# Patient Record
Sex: MALE | Race: WHITE | NOT HISPANIC OR LATINO | Employment: FULL TIME | URBAN - NONMETROPOLITAN AREA
[De-identification: names, ages, dates, MRNs, and addresses within clinical notes are randomized per-mention and may not be internally consistent; named-entity substitution may affect disease eponyms.]

---

## 2017-04-20 ENCOUNTER — TRANSCRIBE ORDERS (OUTPATIENT)
Dept: GENERAL RADIOLOGY | Facility: CLINIC | Age: 47
End: 2017-04-20

## 2017-04-20 DIAGNOSIS — M25.552 LEFT HIP PAIN: Primary | ICD-10-CM

## 2017-10-20 ENCOUNTER — CLINICAL SUPPORT (OUTPATIENT)
Dept: AUDIOLOGY | Facility: CLINIC | Age: 47
End: 2017-10-20

## 2017-10-20 ENCOUNTER — OFFICE VISIT (OUTPATIENT)
Dept: OTOLARYNGOLOGY | Facility: CLINIC | Age: 47
End: 2017-10-20

## 2017-10-20 VITALS — HEIGHT: 65 IN | WEIGHT: 186 LBS | OXYGEN SATURATION: 98 % | BODY MASS INDEX: 30.99 KG/M2

## 2017-10-20 DIAGNOSIS — H61.22 IMPACTED CERUMEN OF LEFT EAR: ICD-10-CM

## 2017-10-20 DIAGNOSIS — H90.42 SENSORINEURAL HEARING LOSS (SNHL) OF LEFT EAR WITH UNRESTRICTED HEARING OF RIGHT EAR: Primary | ICD-10-CM

## 2017-10-20 DIAGNOSIS — IMO0001 LEFT ASYMMETRICAL SNHL: ICD-10-CM

## 2017-10-20 DIAGNOSIS — H81.02 MENIERE'S DISEASE OF LEFT EAR: Primary | ICD-10-CM

## 2017-10-20 PROCEDURE — 92557 COMPREHENSIVE HEARING TEST: CPT | Performed by: AUDIOLOGIST

## 2017-10-20 PROCEDURE — 92567 TYMPANOMETRY: CPT | Performed by: AUDIOLOGIST

## 2017-10-20 PROCEDURE — 99213 OFFICE O/P EST LOW 20 MIN: CPT | Performed by: OTOLARYNGOLOGY

## 2017-10-20 PROCEDURE — 69210 REMOVE IMPACTED EAR WAX UNI: CPT | Performed by: OTOLARYNGOLOGY

## 2017-10-20 RX ORDER — TRIAMTERENE AND HYDROCHLOROTHIAZIDE 37.5; 25 MG/1; MG/1
1 CAPSULE ORAL EVERY MORNING
Qty: 90 CAPSULE | Refills: 3 | Status: SHIPPED | OUTPATIENT
Start: 2017-10-20 | End: 2018-11-30 | Stop reason: SDUPTHER

## 2017-10-20 NOTE — PROGRESS NOTES
STANDARD AUDIOMETRIC EVALUATION      Name:  Aneudy Zhang  :  1970  Age:  47 y.o.  Date of Evaluation:  10/20/2017      HISTORY    Reason for visit:  Aneudy Zhang is seen today for a hearing evaluation at the request of Dr. Shayan Saucedo.  Patient reports that he is in for a 6 month follow-up.  He reports of history of Meniere's disease in his left ear.  He reports that he had a dizzy spell in August, which he thinks happened because he missed taking his water pill three days in a row while he was out of town.      EVALUATION    See Audiogram    RESULTS        Otoscopy and Tympanometry 226 Hz :  Right Ear:  Otoscopy:  Clear ear canal          Tympanometry:  Middle ear function within normal limits    Left Ear:   Otoscopy:  Testing completed after ears were cleaned        Tympanometry:  Middle ear function within normal limits    Test technique:  Standard Audiometry     Pure Tone Audiometry:   Patient responded to pure tones at 5-20 dB for 250-8000 Hz in right ear, and at 45-65 dB for 250-8000 Hz in left ear.       Speech Audiometry:        Right Ear:  Speech Reception Threshold (SRT) was obtained at 5 dBHL                 Speech Discrimination scores were 100% in quiet when words were presented at 45 dBHL       Left Ear:  Speech Reception Threshold (SRT) was obtained at 40 dBHL                 Speech Discrimination scores were 80% in masking noise when words were presented at  75 dBHL    Reliability:   good    IMPRESSIONS:  1.  Tympanometry results are consistent with Middle ear function within normal limits in both ears.  2.  Pure tone results are consistent with hearing sensitivity within normal limits for right ear, and moderate flat sensorineural hearing loss  in left ear.       RECOMMENDATIONS:  Patient is seeing the Ear Nose and Throat physician immediately following this examination.  It was a pleasure seeing Aneudy Zhang in Audiology today.  We would be happy to do further testing or  discuss these test as necessary.          This document has been electronically signed by KO Scnheider on October 20, 2017 11:22 AM          KO Schneider  Licensed Audiologist

## 2017-10-23 NOTE — PROGRESS NOTES
Subjective   Aneudy Zhang is a 47 y.o. male.       History of Present Illness   Patient has left-sided sensorineural hearing loss and has been diagnosed with Ménière's disease.  Is treated with triamterene/HCTZ.  Reports that in the year since he has seen me he did well except he forgot to take his diuretic with him on vacation back in August and had some dizziness at that time.  This improved once he got home and started taking his medication again.  He does need a new prescription.  Says he feels like his hearing is about the same.  No otorrhea.      The following portions of the patient's history were reviewed and updated as appropriate: allergies, current medications, past family history, past medical history, past social history, past surgical history and problem list.     reports that he has never smoked. He has never used smokeless tobacco.   Patient is not a tobacco user and has not been counseled for use of tobacco products      Review of Systems   Constitutional: Negative for fever.   HENT: Positive for hearing loss.            Objective   Physical Exam  General: Well-developed well-nourished male in no acute distress.  Alert and oriented ×3. Head: Normocephalic. Face: Symmetrical strength and appearance. PERRL. EOMI. Voice:Strong. Speech:Fluent  Ears: External ears no deformity, right ear canal shows no discharge.  Tympanic membrane intact and clear.  Left ear canal shows a cerumen impaction  Using the binocular microscope for visualization, cerumen impaction was removed from left ear canal(s) using instrumentation. This was personally performed by Shayan Saucedo MD   Following cerumen removal tympanic membrane was noted be intact and clear and mobile.  Nose: Nares show no discharge mass polyp or purulence.  Boggy mucosa is present.  No gross external deformity.  Septum: Midline  Oral cavity: Lips and gums without lesions.  Tongue and floor of mouth without lesions.  Parotid and submandibular  ducts unobstructed.  No mucosal lesions on the buccal mucosa or vestibule of the mouth.  Pharynx: No erythema exudate mass or ulcer  Neck: No lymphadenopathy.  No thyromegaly.  Trachea and larynx midline.  No masses in the parotid or submandibular glands.    Audiogram is obtained and reviewed and shows normal hearing in the right ear with 100% discrimination and type A tympanogram.  There is a flat moderate sensorineural hearing loss on the left with 80% discrimination.  Hearing is not substantially changed from 1 year ago.    Assessment/Plan   Aneudy was seen today for follow-up.    Diagnoses and all orders for this visit:    Meniere's disease of left ear    Left asymmetrical SNHL    Impacted cerumen of left ear      Plan: Cerumen removed as described above.  Refills called in for his triamterene/HCTZ 37.5/25 one by mouth daily.  Maintain low salt diet.  If symptoms remain well controlled return in a year with another audiogram and call sooner for problems.

## 2018-11-30 RX ORDER — TRIAMTERENE AND HYDROCHLOROTHIAZIDE 37.5; 25 MG/1; MG/1
CAPSULE ORAL
Qty: 30 CAPSULE | Refills: 1 | Status: SHIPPED | OUTPATIENT
Start: 2018-11-30 | End: 2019-01-29 | Stop reason: SDUPTHER

## 2019-01-29 ENCOUNTER — OFFICE VISIT (OUTPATIENT)
Dept: OTOLARYNGOLOGY | Facility: CLINIC | Age: 49
End: 2019-01-29

## 2019-01-29 ENCOUNTER — CLINICAL SUPPORT (OUTPATIENT)
Dept: AUDIOLOGY | Facility: CLINIC | Age: 49
End: 2019-01-29

## 2019-01-29 VITALS — RESPIRATION RATE: 17 BRPM | BODY MASS INDEX: 30.57 KG/M2 | WEIGHT: 190.2 LBS | HEIGHT: 66 IN

## 2019-01-29 DIAGNOSIS — H81.09 MENIERE DISEASE, UNSPECIFIED LATERALITY: Primary | ICD-10-CM

## 2019-01-29 DIAGNOSIS — IMO0001 LEFT ASYMMETRICAL SNHL: ICD-10-CM

## 2019-01-29 DIAGNOSIS — H81.02 MENIERE'S DISEASE OF LEFT EAR: Primary | ICD-10-CM

## 2019-01-29 DIAGNOSIS — H90.42 SENSORINEURAL HEARING LOSS (SNHL) OF LEFT EAR WITH UNRESTRICTED HEARING OF RIGHT EAR: Primary | ICD-10-CM

## 2019-01-29 PROCEDURE — 92567 TYMPANOMETRY: CPT | Performed by: AUDIOLOGIST

## 2019-01-29 PROCEDURE — 92557 COMPREHENSIVE HEARING TEST: CPT | Performed by: AUDIOLOGIST

## 2019-01-29 PROCEDURE — 99214 OFFICE O/P EST MOD 30 MIN: CPT | Performed by: OTOLARYNGOLOGY

## 2019-01-29 RX ORDER — TRIAMTERENE AND HYDROCHLOROTHIAZIDE 37.5; 25 MG/1; MG/1
1 CAPSULE ORAL EVERY MORNING
Qty: 30 CAPSULE | Refills: 11 | Status: SHIPPED | OUTPATIENT
Start: 2019-01-29 | End: 2021-02-09

## 2019-01-29 NOTE — PROGRESS NOTES
STANDARD AUDIOMETRIC EVALUATION      Name:  Aneudy Zhang  :  1970  Age:  48 y.o.  Date of Evaluation:  2019      HISTORY    Reason for visit:  Aneudy Zhang is seen today for a yearly hearing evaluation at the request of Dr. Shayan Saucedo.  Patient reports he has Meniere's Disease in his left ear.  He states he has associated symptoms such as ringing, hearing loss, and dizziness.  He reports he doesn't think his hearing has progressed.       EVALUATION    See Audiogram    RESULTS        Otoscopy and Tympanometry 226 Hz :  Right Ear:  Otoscopy:  Clear ear canal          Tympanometry:  Middle ear function within normal limits    Left Ear:   Otoscopy:  Clear ear canal        Tympanometry:  Middle ear function within normal limits    Test technique:  Standard Audiometry     Pure Tone Audiometry:   Patient responded to pure tones at 5-20 dB for 250-8000 Hz in right ear, and at 45-55 dB for 250-8000 Hz in left ear.       Speech Audiometry:        Right Ear:  Speech Reception Threshold (SRT) was obtained at 10 dBHL                 Speech Discrimination scores were 100% in quiet when words were presented at 50 dBHL       Left Ear:  Speech Reception Threshold (SRT) was obtained at 50 dBHL, masked                 Speech Discrimination scores were 72% in masking noise when words were presented at  80 dBHL    Reliability:   good    IMPRESSIONS:  1.  Tympanometry results are consistent with Middle ear function within normal limits in both ears.  2.  Pure tone results are consistent with hearing sensitivity within normal limits for right ear, and moderate flat sensorineural hearing loss  in left ear.       RECOMMENDATIONS:  Patient is seeing the Ear Nose and Throat physician immediately following this examination.  It was a pleasure seeing Aneudy Zhang in Audiology today.  We would be happy to do further testing or discuss these test as necessary.          This document has been electronically signed by  Soila Greenwood, MS OSMAN-A on January 29, 2019 3:45 PM       Soila Greenwood MS CCC-A  Licensed Audiologist

## 2019-01-31 ENCOUNTER — LAB (OUTPATIENT)
Dept: LAB | Facility: OTHER | Age: 49
End: 2019-01-31

## 2019-01-31 DIAGNOSIS — H81.09 MENIERE DISEASE, UNSPECIFIED LATERALITY: ICD-10-CM

## 2019-01-31 LAB
ANION GAP SERPL CALCULATED.3IONS-SCNC: 8 MMOL/L (ref 5–15)
BUN BLD-MCNC: 26 MG/DL (ref 9–20)
BUN/CREAT SERPL: 21.7 (ref 7–25)
CALCIUM SPEC-SCNC: 8.5 MG/DL (ref 8.4–10.2)
CHLORIDE SERPL-SCNC: 104 MMOL/L (ref 98–107)
CO2 SERPL-SCNC: 30 MMOL/L (ref 22–30)
CREAT BLD-MCNC: 1.2 MG/DL (ref 0.66–1.25)
GFR SERPL CREATININE-BSD FRML MDRD: 65 ML/MIN/1.73 (ref 63–147)
GLUCOSE BLD-MCNC: 111 MG/DL (ref 74–99)
POTASSIUM BLD-SCNC: 3.7 MMOL/L (ref 3.4–5)
SODIUM BLD-SCNC: 142 MMOL/L (ref 137–145)

## 2019-01-31 PROCEDURE — 36415 COLL VENOUS BLD VENIPUNCTURE: CPT | Performed by: OTOLARYNGOLOGY

## 2019-01-31 PROCEDURE — 80048 BASIC METABOLIC PNL TOTAL CA: CPT | Performed by: OTOLARYNGOLOGY

## 2019-02-01 NOTE — PROGRESS NOTES
Subjective   Aneudy Zhang is a 48 y.o. male.       History of Present Illness   Patient has left-sided Ménière's disease.  Has markedly asymmetrical hearing.  Had a negative MRI.  Initially got some response with medical therapy but eventually decreased again and has been subjectively not significantly changed since he was last seen in October 2017.  Takes triamterene/HCTZ 37.5/25 once a day.  Has had occasional mild dizziness but no significant episodes of vertigo.  Doesn't feel like his hearing is appreciably decreased.      The following portions of the patient's history were reviewed and updated as appropriate: allergies, current medications, past family history, past medical history, past social history, past surgical history and problem list.     reports that  has never smoked. he has never used smokeless tobacco.   Patient is not a tobacco user and has not been counseled for use of tobacco products      Review of Systems   Constitutional: Negative for fever.   HENT: Positive for hearing loss.            Objective   Physical Exam  General: Well-developed well-nourished male in no acute distress.  Alert and oriented ×3. Head: Normocephalic.Voice:Strong. Speech:Fluent  Ears: External ears no deformity, canals no discharge, tympanic membranes intact clear and mobile bilaterally.  Nose: Nares show no discharge mass polyp or purulence.  Boggy mucosa is present.  No gross external deformity.    Oral cavity: Lips and gums without lesions.  Tongue and floor of mouth without lesions.  Parotid and submandibular ducts unobstructed.  No mucosal lesions on the buccal mucosa or vestibule of the mouth.  Pharynx: No erythema exudate mass or ulcer  Neck: No lymphadenopathy.  No thyromegaly.  Trachea and larynx midline.  No masses in the parotid or submandibular glands.    Audiogram is obtained and reviewed and shows hearing within normal limits on the right and a flat moderate sensorineural hearing loss on the left.   Tympanograms are type A bilaterally.  Discrimination scores are 100% on the right 72% on the left.  Compared to October 2017 thresholds are essentially unchanged.      Assessment/Plan   Aneudy was seen today for follow-up.    Diagnoses and all orders for this visit:    Meniere's disease of left ear  -     Basic Metabolic Panel; Future    Left asymmetrical SNHL    Other orders  -     triamterene-hydrochlorothiazide (DYAZIDE) 37.5-25 MG per capsule; Take 1 capsule by mouth Every Morning.      Plan: Continue triamterene/HCTZ 37.5/25 daily.  Continue low-salt diet.  Obtained Chem-7 today.  Return in 1 year with another hearing test, call sooner for problems.    Addendum: 2/1/19, Chem-7 shows a mildly elevated BUN at 26.  Creatinine and potassium were both normal.  Spoke with patient by telephone and advised him to take his triamterene/HCTZ 1 every other day for the next month.  If his symptoms remain well controlled he should return in a month for another basic metabolic panel.  If he develops significant dizziness or notices his hearing significantly decreasing he is to call.  Patient voices understanding and agreement.

## 2020-01-31 ENCOUNTER — OFFICE VISIT (OUTPATIENT)
Dept: OTOLARYNGOLOGY | Facility: CLINIC | Age: 50
End: 2020-01-31

## 2020-01-31 ENCOUNTER — LAB (OUTPATIENT)
Dept: LAB | Facility: OTHER | Age: 50
End: 2020-01-31

## 2020-01-31 ENCOUNTER — CLINICAL SUPPORT (OUTPATIENT)
Dept: AUDIOLOGY | Facility: CLINIC | Age: 50
End: 2020-01-31

## 2020-01-31 VITALS — BODY MASS INDEX: 28.93 KG/M2 | WEIGHT: 180 LBS | OXYGEN SATURATION: 96 % | HEART RATE: 53 BPM | HEIGHT: 66 IN

## 2020-01-31 DIAGNOSIS — H81.02 MENIERE'S DISEASE OF LEFT EAR: Primary | ICD-10-CM

## 2020-01-31 DIAGNOSIS — H90.42 SENSORINEURAL HEARING LOSS (SNHL) OF LEFT EAR WITH UNRESTRICTED HEARING OF RIGHT EAR: Primary | ICD-10-CM

## 2020-01-31 DIAGNOSIS — IMO0001 LEFT ASYMMETRICAL SNHL: ICD-10-CM

## 2020-01-31 DIAGNOSIS — H81.02 MENIERE DISEASE, LEFT: ICD-10-CM

## 2020-01-31 DIAGNOSIS — H81.02 MENIERE DISEASE, LEFT: Primary | ICD-10-CM

## 2020-01-31 DIAGNOSIS — H93.12 TINNITUS, LEFT: ICD-10-CM

## 2020-01-31 LAB
ANION GAP SERPL CALCULATED.3IONS-SCNC: 6 MMOL/L (ref 5–15)
BUN BLD-MCNC: 31 MG/DL (ref 7–23)
BUN/CREAT SERPL: 27.4 (ref 7–25)
CALCIUM SPEC-SCNC: 8.7 MG/DL (ref 8.4–10.2)
CHLORIDE SERPL-SCNC: 104 MMOL/L (ref 101–112)
CO2 SERPL-SCNC: 32 MMOL/L (ref 22–30)
CREAT BLD-MCNC: 1.13 MG/DL (ref 0.7–1.3)
GFR SERPL CREATININE-BSD FRML MDRD: 69 ML/MIN/1.73 (ref 63–147)
GLUCOSE BLD-MCNC: 96 MG/DL (ref 70–99)
POTASSIUM BLD-SCNC: 3.3 MMOL/L (ref 3.4–5)
SODIUM BLD-SCNC: 142 MMOL/L (ref 137–145)

## 2020-01-31 PROCEDURE — 80048 BASIC METABOLIC PNL TOTAL CA: CPT | Performed by: OTOLARYNGOLOGY

## 2020-01-31 PROCEDURE — 92567 TYMPANOMETRY: CPT | Performed by: AUDIOLOGIST

## 2020-01-31 PROCEDURE — 99213 OFFICE O/P EST LOW 20 MIN: CPT | Performed by: OTOLARYNGOLOGY

## 2020-01-31 PROCEDURE — 36415 COLL VENOUS BLD VENIPUNCTURE: CPT | Performed by: OTOLARYNGOLOGY

## 2020-01-31 PROCEDURE — 92557 COMPREHENSIVE HEARING TEST: CPT | Performed by: AUDIOLOGIST

## 2020-01-31 RX ORDER — HYDROCHLOROTHIAZIDE 12.5 MG/1
12.5 CAPSULE, GELATIN COATED ORAL
COMMUNITY
End: 2021-02-09 | Stop reason: ALTCHOICE

## 2020-01-31 RX ORDER — FLUTICASONE PROPIONATE 50 MCG
1 SPRAY, SUSPENSION (ML) NASAL DAILY
COMMUNITY
Start: 2019-12-12 | End: 2020-12-12

## 2020-01-31 RX ORDER — ALBUTEROL SULFATE 90 UG/1
2 AEROSOL, METERED RESPIRATORY (INHALATION)
COMMUNITY
Start: 2019-12-13 | End: 2020-12-13

## 2020-01-31 RX ORDER — ALLOPURINOL 100 MG/1
100 TABLET ORAL DAILY
COMMUNITY
Start: 2019-11-01 | End: 2021-02-09 | Stop reason: SDUPTHER

## 2020-01-31 RX ORDER — CETIRIZINE HYDROCHLORIDE 10 MG/1
10 TABLET ORAL
COMMUNITY
Start: 2019-12-04 | End: 2021-08-18

## 2020-01-31 RX ORDER — ALBUTEROL SULFATE 90 UG/1
2 AEROSOL, METERED RESPIRATORY (INHALATION) EVERY 6 HOURS PRN
COMMUNITY
Start: 2019-12-13 | End: 2022-08-05

## 2020-01-31 NOTE — PROGRESS NOTES
STANDARD AUDIOMETRIC EVALUATION      Name:  Aneudy Zhang  :  1970  Age:  49 y.o.  Date of Evaluation:  2020      HISTORY    Reason for visit:  Aneudy Zhang is seen today for a yearly hearing test at the request of Dr. Shayan Saucedo.  It was previously reported the patient has Meniere's Disease in his left ear, and a unilateral sensorineural hearing loss in his left ear.  Today, Patient reports he has not noticed any changes in his hearing or new problems with his ears.  He states he has not gotten dizzy in awhile.  He states he still hears a buzzing sound in his left ear which hasn't changed.       EVALUATION    See Audiogram    RESULTS        Otoscopy and Tympanometry 226 Hz :  Right Ear:  Otoscopy:  Clear ear canal          Tympanometry:  Middle ear function within normal limits    Left Ear:   Otoscopy:  Clear ear canal        Tympanometry:  Middle ear function within normal limits    Test technique:  Standard Audiometry     Pure Tone Audiometry:   Patient responded to pure tones at 5-20 dB for 250-8000 Hz in right ear, and at 45-55 dB for 250-8000 Hz in left ear.       Speech Audiometry:        Right Ear:  Speech Reception Threshold (SRT) was obtained at 0 dBHL                 Speech Discrimination scores were 100% in quiet when words were presented at 40 dBHL       Left Ear:  Speech Reception Threshold (SRT) was obtained at 55 dBHL, masked                 Speech Discrimination scores were 68% in masking noise when words were presented at  85 dBHL    Reliability:   good    IMPRESSIONS:  1.  Tympanometry results are consistent with Middle ear function within normal limits in both ears.  2.  Pure tone results are consistent with hearing sensitivity within normal limits for right ear, and moderate flat sensorineural hearing loss  in left ear.       RECOMMENDATIONS:  Patient is seeing the Ear Nose and Throat physician immediately following this examination.  It was a pleasure seeing Aneudy Sarkar  Vicente in Audiology today.  We would be happy to do further testing or discuss these test as necessary.          This document has been electronically signed by Soila Greenwood MS CCC-A on January 31, 2020 9:56 AM       Soila Greenwood MS CCC-A  Licensed Audiologist

## 2020-01-31 NOTE — PROGRESS NOTES
Subjective   Aneudy Zhang is a 49 y.o. male.       History of Present Illness   Patient is followed with Ménière's disease of the left ear.  Has markedly asymmetrical hearing.  Had a negative MRI.  Responded to medical therapy initially but subsequently had decreased on the left and has stayed relatively stable with a flat moderate hearing loss on the left and normal hearing on the right.  1 year ago was seen and was taking triamterene/HCTZ 37.5/25 daily.  His BUN was slightly elevated and I advised him to take his diuretic every other day for the next month and return for more lab work but he did not do this.  Review of his chart shows he was seen elsewhere and underwent a renal function panel in July 2019 with a mildly elevated BUN of 24 and a mildly elevated creatinine of 1.4.  He says his medical regimen was not changed and he continues to take 1 pill a day.  No dizziness and no subjective change in hearing.  His current medication list actually show him to be on 3 different diuretics      The following portions of the patient's history were reviewed and updated as appropriate: allergies, current medications, past family history, past medical history, past social history, past surgical history and problem list.     reports that he has never smoked. He has never used smokeless tobacco.   Patient is not a tobacco user and has not been counseled for use of tobacco products      Review of Systems   Constitutional: Positive for appetite change. Negative for fever.   HENT: Positive for hearing loss.    Respiratory: Positive for cough and wheezing.            Objective   Physical Exam  Ears: External ears no deformity, canals no discharge, tympanic membranes intact clear and mobile bilaterally.    Audiogram is obtained and reviewed and shows normal hearing in the right ear with a flat moderate sensorineural hearing loss on the left that is not significantly changed from 1 year ago.  Tympanograms are type a  bilaterally.  Discrimination scores are 100% on the right 68% on the left.      Assessment/Plan   Aneudy was seen today for follow-up.    Diagnoses and all orders for this visit:    Meniere's disease of left ear    Left asymmetrical SNHL        Plan: We will recheck basic metabolic panel today.  If still abnormal would recommend permanently decreasing his diuretic dosage to either half pill a day or 1 pill every other day.  Will call and discuss results once available.  We will go ahead and schedule his 1 year follow-up with audiogram but may see him sooner depending on laboratory studies

## 2020-02-05 DIAGNOSIS — H81.09 MENIERE'S DISEASE, UNSPECIFIED LATERALITY: Primary | ICD-10-CM

## 2020-06-17 RX ORDER — TRIAMTERENE AND HYDROCHLOROTHIAZIDE 37.5; 25 MG/1; MG/1
CAPSULE ORAL
Qty: 30 CAPSULE | Refills: 0 | OUTPATIENT
Start: 2020-06-17

## 2020-06-17 NOTE — TELEPHONE ENCOUNTER
Patient was supposed to have obtained a basic metabolic panel earlier this year to determine whether or not his dose needed to be adjusted.  Will not refill until he gets the lab work done

## 2020-06-18 ENCOUNTER — TELEPHONE (OUTPATIENT)
Dept: OTOLARYNGOLOGY | Facility: CLINIC | Age: 50
End: 2020-06-18

## 2020-06-18 ENCOUNTER — LAB (OUTPATIENT)
Dept: LAB | Facility: OTHER | Age: 50
End: 2020-06-18

## 2020-06-18 DIAGNOSIS — H81.09 MENIERE'S DISEASE, UNSPECIFIED LATERALITY: ICD-10-CM

## 2020-06-18 LAB
ANION GAP SERPL CALCULATED.3IONS-SCNC: 6 MMOL/L (ref 5–15)
BUN BLD-MCNC: 34 MG/DL (ref 7–23)
BUN/CREAT SERPL: 25.6 (ref 7–25)
CALCIUM SPEC-SCNC: 9.1 MG/DL (ref 8.4–10.2)
CHLORIDE SERPL-SCNC: 102 MMOL/L (ref 101–112)
CO2 SERPL-SCNC: 29 MMOL/L (ref 22–30)
CREAT BLD-MCNC: 1.33 MG/DL (ref 0.7–1.3)
GFR SERPL CREATININE-BSD FRML MDRD: 57 ML/MIN/1.73 (ref 63–147)
GLUCOSE BLD-MCNC: 102 MG/DL (ref 70–99)
POTASSIUM BLD-SCNC: 3.7 MMOL/L (ref 3.4–5)
SODIUM BLD-SCNC: 137 MMOL/L (ref 137–145)

## 2020-06-18 PROCEDURE — 36415 COLL VENOUS BLD VENIPUNCTURE: CPT | Performed by: OTOLARYNGOLOGY

## 2020-06-18 PROCEDURE — 80048 BASIC METABOLIC PNL TOTAL CA: CPT | Performed by: OTOLARYNGOLOGY

## 2020-06-18 NOTE — TELEPHONE ENCOUNTER
"Spoke with patient regarding his lab results.  BUN and creatinine remain elevated.  He states he only takes the triamterene/HCTZ \"when he feels like he needs it\".  Says it is been several days since he took it.  Apparently is also on some blood pressure medicine with HCTZ in it.  I informed him that with his BUN and creatinine being abnormal I did not want him to use the triamterene/HCTZ anymore.  Advised him to use a low-salt diet.  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  I encouraged him to see them as soon as possible and let them know that his kidney function is abnormal for further medical evaluation.  He is to keep his appointment with me in January 2021 or call sooner for problems.  "

## 2020-08-13 NOTE — TELEPHONE ENCOUNTER
Telephoned patient stating that we will not be able to refill medication until lab work is done.  Patient voices understanding.   Initial Size Of Lesion: 1.2

## 2021-01-20 RX ORDER — MECLIZINE HYDROCHLORIDE 25 MG/1
25 TABLET ORAL 3 TIMES DAILY PRN
Qty: 30 TABLET | Refills: 5 | Status: SHIPPED | OUTPATIENT
Start: 2021-01-20 | End: 2022-08-05

## 2021-02-09 ENCOUNTER — CLINICAL SUPPORT (OUTPATIENT)
Dept: AUDIOLOGY | Facility: CLINIC | Age: 51
End: 2021-02-09

## 2021-02-09 ENCOUNTER — OFFICE VISIT (OUTPATIENT)
Dept: OTOLARYNGOLOGY | Facility: CLINIC | Age: 51
End: 2021-02-09

## 2021-02-09 VITALS — WEIGHT: 189 LBS | HEIGHT: 65 IN | OXYGEN SATURATION: 98 % | BODY MASS INDEX: 31.49 KG/M2

## 2021-02-09 DIAGNOSIS — H93.12 TINNITUS OF LEFT EAR: ICD-10-CM

## 2021-02-09 DIAGNOSIS — H81.11 BPPV (BENIGN PAROXYSMAL POSITIONAL VERTIGO), RIGHT: ICD-10-CM

## 2021-02-09 DIAGNOSIS — R42 DIZZINESS: ICD-10-CM

## 2021-02-09 DIAGNOSIS — H81.02 MENIERE DISEASE, LEFT: Primary | ICD-10-CM

## 2021-02-09 DIAGNOSIS — H90.42 SENSORINEURAL HEARING LOSS (SNHL) OF LEFT EAR WITH UNRESTRICTED HEARING OF RIGHT EAR: ICD-10-CM

## 2021-02-09 DIAGNOSIS — H90.42 SENSORINEURAL HEARING LOSS (SNHL) OF LEFT EAR WITH UNRESTRICTED HEARING OF RIGHT EAR: Primary | ICD-10-CM

## 2021-02-09 PROCEDURE — 92567 TYMPANOMETRY: CPT | Performed by: AUDIOLOGIST

## 2021-02-09 PROCEDURE — 92557 COMPREHENSIVE HEARING TEST: CPT | Performed by: AUDIOLOGIST

## 2021-02-09 PROCEDURE — 99214 OFFICE O/P EST MOD 30 MIN: CPT | Performed by: OTOLARYNGOLOGY

## 2021-02-09 RX ORDER — MONTELUKAST SODIUM 10 MG/1
TABLET ORAL
COMMUNITY
Start: 2020-12-17

## 2021-02-09 NOTE — PROGRESS NOTES
STANDARD AUDIOMETRIC EVALUATION      Name:  Aneudy hZang  :  1970  Age:  50 y.o.  Date of Evaluation:  2021      HISTORY    Reason for visit:  Aneudy Zhang is seen today for a yearly hearing test at the request of Dr. Shayan Saucedo.  It was previously reported that he has Meniere's Disease in his left ear, and a unilateral sensorineural hearing loss in his left ear.  Today, Patient reports he has been having a lot of problems with vertigo the last couple weeks.  He states he may have a change in his hearing. He states the ringing in his left ear has not changed.       EVALUATION    See Audiogram    RESULTS        Otoscopy and Tympanometry 226 Hz :  Right Ear:  Otoscopy:  Clear ear canal          Tympanometry:  Middle ear function within normal limits    Left Ear:   Otoscopy:  Clear ear canal        Tympanometry:  Middle ear function within normal limits    Test technique:  Standard Audiometry     Pure Tone Audiometry:   Patient responded to pure tones at 10-15 dB for 250-8000 Hz in right ear, and at 40-55 dB for 250-8000 Hz in left ear.       Speech Audiometry:        Right Ear:  Speech Reception Threshold (SRT) was obtained at 10 dBHL                 Speech Discrimination scores were 100% in quiet when words were presented at 50 dBHL       Left Ear:  Speech Reception Threshold (SRT) was obtained at 45 dBHL                 Speech Discrimination scores were 68% in masking noise when words were presented at  80 dBHL    Reliability:   good    IMPRESSIONS:  1.  Tympanometry results are consistent with Middle ear function within normal limits in both ears.  2.  Pure tone results are consistent with hearing sensitivity within normal limits for right ear, and mild to moderate flat sensorineural hearing loss  in left ear.       RECOMMENDATIONS:  Patient is seeing the Ear Nose and Throat physician immediately following this examination.  It was a pleasure seeing Aneudy Zhang in Audiology today.  We  would be happy to do further testing or discuss these test as necessary.          This document has been electronically signed by Soila Greenwood MS CCC-TA on February 9, 2021 16:28 CST       Soila Greewnood MS CCC-TA  Licensed Audiologist

## 2021-02-10 NOTE — PROGRESS NOTES
Subjective   Aneudy Zhang is a 50 y.o. male.       History of Present Illness   Patient has been followed with left-sided Ménière's disease.  Had markedly asymmetrical hearing and a negative MRI.  Hearing initially responded to medical therapy but subsequently decreased and has had a relatively stable flat moderate hearing loss on the left with normal hearing on the right.  At 1 point there was some confusion about his medications and he was listed as taking 3 different medicines with diuretic.  Was noted to have abnormal kidney function.  Returns today stating he is having dizziness.  Says this happens every time he lies down.  Says it is a spinning sensation.  Is been going on for at least a couple of months.  Meclizine did not help.  Feels like his hearing is stable.  He also had lab work done elsewhere which showed a mildly elevated BUN with normal creatinine and potassium.  He is now only taking 1 medicine with diuretic in it and that is 6.25 mg of hydrochlorothiazide as part of a prescription of Ziac which he uses for his blood pressure.      The following portions of the patient's history were reviewed and updated as appropriate: allergies, current medications, past family history, past medical history, past social history, past surgical history and problem list.     reports that he has never smoked. He has never used smokeless tobacco.   Patient is not a tobacco user and has not been counseled for use of tobacco products      Review of Systems        Objective   Physical Exam  Ears: External ears no deformity, canals no discharge, tympanic membranes intact clear and mobile bilaterally.  Laney-Hallpike maneuvers produced a classic latent rotatory nystagmus and subjective spinning vertigo with head to the right    Audiogram was obtained and reviewed and shows  normal hearing on the right in all frequencies with type a tympanogram and 100% discrimination.  There is a flat mild to moderate sensorineural hearing  loss on the left with 68% discrimination and a type a tympanogram.  Pure-tone thresholds on the left are slightly improved compared to 1 year ago.    Assessment/Plan   Diagnoses and all orders for this visit:    1. Meniere disease, left (Primary)    2. BPPV (benign paroxysmal positional vertigo), right    3. Sensorineural hearing loss (SNHL) of left ear with unrestricted hearing of right ear      Plan: Explained to the patient that his current dizziness is not Ménière's disease but a separate problem.  Explained the nature of BPPV to the patient as well as the process of compensation.  Recommended Cawthorne exercises and if his symptoms improved just return in 1 year with another hearing test.  Maintain low-salt diet.  Continue his medications and lab work per his family doctor.  Will not add any diuretic at this point since his hearing is stable and his dizziness is not actually due to Ménière's disease.  Told him to call if the dizziness had not improved after 6 weeks of the Cawthorne exercises.

## 2021-08-18 ENCOUNTER — OFFICE VISIT (OUTPATIENT)
Dept: GASTROENTEROLOGY | Facility: CLINIC | Age: 51
End: 2021-08-18

## 2021-08-18 VITALS
SYSTOLIC BLOOD PRESSURE: 108 MMHG | WEIGHT: 186.2 LBS | BODY MASS INDEX: 31.02 KG/M2 | HEART RATE: 56 BPM | DIASTOLIC BLOOD PRESSURE: 72 MMHG | HEIGHT: 65 IN

## 2021-08-18 DIAGNOSIS — R19.4 CHANGE IN BOWEL HABITS: ICD-10-CM

## 2021-08-18 DIAGNOSIS — Z80.0 FH: COLON CANCER: Primary | ICD-10-CM

## 2021-08-18 DIAGNOSIS — R10.84 GENERALIZED ABDOMINAL PAIN: ICD-10-CM

## 2021-08-18 PROBLEM — M25.552 PAIN OF LEFT HIP JOINT: Status: ACTIVE | Noted: 2018-09-13

## 2021-08-18 PROBLEM — I10 ESSENTIAL HYPERTENSION: Status: ACTIVE | Noted: 2019-10-08

## 2021-08-18 PROBLEM — J30.2 SEASONAL ALLERGIES: Status: ACTIVE | Noted: 2019-10-08

## 2021-08-18 PROBLEM — J45.20 MILD INTERMITTENT ASTHMA: Status: ACTIVE | Noted: 2021-06-08

## 2021-08-18 PROBLEM — E78.00 ELEVATED LDL CHOLESTEROL LEVEL: Status: ACTIVE | Noted: 2021-06-08

## 2021-08-18 PROBLEM — L30.9 ECZEMA: Status: ACTIVE | Noted: 2020-04-03

## 2021-08-18 PROCEDURE — 99203 OFFICE O/P NEW LOW 30 MIN: CPT | Performed by: NURSE PRACTITIONER

## 2021-08-18 RX ORDER — FLUTICASONE PROPIONATE 50 MCG
SPRAY, SUSPENSION (ML) NASAL
COMMUNITY
Start: 2021-08-06

## 2021-08-18 RX ORDER — DEXTROSE AND SODIUM CHLORIDE 5; .45 G/100ML; G/100ML
30 INJECTION, SOLUTION INTRAVENOUS CONTINUOUS PRN
Status: CANCELLED | OUTPATIENT
Start: 2021-08-18

## 2021-08-18 RX ORDER — PHENOL 1.4 %
600 AEROSOL, SPRAY (ML) MUCOUS MEMBRANE DAILY
COMMUNITY

## 2021-08-18 RX ORDER — ERGOCALCIFEROL (VITAMIN D2) 10 MCG
400 TABLET ORAL DAILY
COMMUNITY

## 2021-08-18 RX ORDER — SODIUM, POTASSIUM,MAG SULFATES 17.5-3.13G
1 SOLUTION, RECONSTITUTED, ORAL ORAL EVERY 12 HOURS
Qty: 177 ML | Refills: 0 | Status: SHIPPED | OUTPATIENT
Start: 2021-08-18 | End: 2022-08-05

## 2021-08-18 RX ORDER — DUPILUMAB 300 MG/2ML
300 INJECTION, SOLUTION SUBCUTANEOUS
COMMUNITY

## 2021-08-18 NOTE — PROGRESS NOTES
Chief Complaint   Patient presents with   • Colon Cancer Screening       Subjective    Aneudy Zhang is a 51 y.o. male. he is here today for follow-up.    51-year-old male presents to discuss abdominal pain change in bowel habits and diarrhea.  Reports symptoms have been ongoing about 2 years but he has waited to come and reports last year he saw NP about it had stool studies which was negative for parasites and blood.  States in 2012 he went in for hernia repair and had partial colon resection due to scar tissue.  He reports family history of colon cancer in his mother was diagnosed at age 55.  Denies any visible blood in the stool.    Abdominal Pain  This is a chronic problem. The current episode started more than 1 year ago (started 2019 ). The onset quality is gradual. The problem occurs intermittently. The problem has been waxing and waning. The pain is located in the generalized abdominal region. The quality of the pain is cramping. The abdominal pain does not radiate. Associated symptoms include belching and diarrhea. Pertinent negatives include no anorexia, arthralgias, constipation, dysuria, fever, flatus, frequency, headaches, hematochezia, hematuria, nausea or vomiting. Exacerbated by: unable to pinpoint any certain trigger  The pain is relieved by bowel movements. Prior workup: stool study negative for ova, parasites and blood (PCP office)        Plan; schedule patient for EGD and colonoscopy due to abdominal pain change in bowel habits diarrhea and family history of colon cancer in first-degree relative at age 55.     The following portions of the patient's history were reviewed and updated as appropriate:   Past Medical History:   Diagnosis Date   • High blood pressure    • Hyperlipidemia    • Mild intermittent asthma 6/8/2021     Past Surgical History:   Procedure Laterality Date   • COLON RESECTION     • HERNIA REPAIR     • OTHER SURGICAL HISTORY      renal stenosis     Family History   Problem  "Relation Age of Onset   • Cancer Mother        Prior to Admission medications    Medication Sig Start Date End Date Taking? Authorizing Provider   albuterol sulfate  (90 Base) MCG/ACT inhaler Inhale 2 puffs Every 6 (Six) Hours As Needed. 12/13/19  Yes Zane Mendez MD   allopurinol (ZYLOPRIM) 100 MG tablet Take 100 mg by mouth daily.   Yes Zane Mendez MD   bisoprolol-hydrochlorothiazide (ZIAC) 10-6.25 MG per tablet Take 1 tablet by mouth daily.   Yes Zane Mendez MD   calcium carbonate (OS-ARABELLA) 600 MG tablet Take 600 mg by mouth Daily.   Yes Zane Mendez MD   Dupilumab (Dupixent) 300 MG/2ML solution prefilled syringe Inject 300 mg under the skin into the appropriate area as directed.   Yes Zane Mendez MD   enalapril (VASOTEC) 10 MG tablet Take 10 mg by mouth daily.   Yes Zane Mendez MD   fluticasone (FLONASE) 50 MCG/ACT nasal spray INSTILL 1 SPRAY IN EACH NOSTRL DAILY 8/6/21  Yes Zane Mendez MD   meclizine (ANTIVERT) 25 MG tablet Take 1 tablet by mouth 3 (Three) Times a Day As Needed for Dizziness. 1/20/21  Yes Shayan Saucedo MD   montelukast (SINGULAIR) 10 MG tablet TAKE 1 TABLET BY MOUTH EVERY EVENING 12/17/20  Yes Zane Mendez MD   Potassium (POTASSIMIN PO) Take  by mouth.   Yes Zane Mendez MD   Vitamin D, Cholecalciferol, (CHOLECALCIFEROL) 10 MCG (400 UNIT) tablet Take 400 Units by mouth Daily.   Yes Zane Mendez MD   cetirizine (zyrTEC) 10 MG tablet Take 10 mg by mouth every night at bedtime. 12/4/19   Zane Mendez MD     Allergies   Allergen Reactions   • Avelox [Moxifloxacin] Other (See Comments)     \"super sensitive taste buds\"     Social History     Socioeconomic History   • Marital status:      Spouse name: Not on file   • Number of children: Not on file   • Years of education: Not on file   • Highest education level: Not on file   Tobacco Use   • Smoking status: Never Smoker   • " "Smokeless tobacco: Never Used       Review of Systems  Review of Systems   Constitutional: Positive for fatigue. Negative for activity change, appetite change, chills, diaphoresis, fever and unexpected weight change.   HENT: Negative for trouble swallowing.    Gastrointestinal: Positive for abdominal pain (cramping ) and diarrhea. Negative for abdominal distention, anal bleeding, anorexia, blood in stool, constipation, flatus, hematochezia, nausea, rectal pain and vomiting.   Genitourinary: Negative for dysuria, frequency and hematuria.   Musculoskeletal: Negative for arthralgias.   Neurological: Negative for headaches.        /72 (BP Location: Left arm)   Pulse 56   Ht 165.1 cm (65\")   Wt 84.5 kg (186 lb 3.2 oz)   BMI 30.99 kg/m²     Objective    Physical Exam  Constitutional:       General: He is not in acute distress.     Appearance: Normal appearance. He is normal weight.   HENT:      Head: Normocephalic and atraumatic.   Pulmonary:      Effort: Pulmonary effort is normal.   Abdominal:      General: Bowel sounds are normal. There is no distension.      Palpations: Abdomen is soft.      Tenderness: There is abdominal tenderness in the right upper quadrant, epigastric area and left upper quadrant.       Lab on 06/18/2020   Component Date Value Ref Range Status   • Glucose 06/18/2020 102* 70 - 99 mg/dL Final   • BUN 06/18/2020 34* 7 - 23 mg/dL Final   • Creatinine 06/18/2020 1.33* 0.70 - 1.30 mg/dL Final   • Sodium 06/18/2020 137  137 - 145 mmol/L Final   • Potassium 06/18/2020 3.7  3.4 - 5.0 mmol/L Final   • Chloride 06/18/2020 102  101 - 112 mmol/L Final   • CO2 06/18/2020 29.0  22.0 - 30.0 mmol/L Final   • Calcium 06/18/2020 9.1  8.4 - 10.2 mg/dL Final   • eGFR Non African Amer 06/18/2020 57* 63 - 147 mL/min/1.73 Final   • BUN/Creatinine Ratio 06/18/2020 25.6* 7.0 - 25.0 Final   • Anion Gap 06/18/2020 6.0  5.0 - 15.0 mmol/L Final     Assessment/Plan      1. FH: colon cancer    2. Change in bowel habits "    3. Generalized abdominal pain    .       Orders placed during this encounter include:  Orders Placed This Encounter   Procedures   • Follow Anesthesia Guidelines / Standing Orders     Standing Status:   Future   • Obtain Informed Consent     Standing Status:   Future     Order Specific Question:   Informed Consent Given For     Answer:   ESOPHAGOGASTRODUODENOSCOPY and Colonoscopy       ESOPHAGOGASTRODUODENOSCOPY (N/A), COLONOSCOPY (N/A)    Review and/or summary of lab tests, radiology, procedures, medications. Review and summary of old records and obtaining of history. The risks and benefits of my recommendations, as well as other treatment options were discussed with the patient today. Questions were answered.    New Medications Ordered This Visit   Medications   • sodium-potassium-magnesium sulfates (Suprep Bowel Prep Kit) 17.5-3.13-1.6 GM/177ML solution oral solution     Sig: Take 1 bottle by mouth Every 12 (Twelve) Hours.     Dispense:  177 mL     Refill:  0       Follow-up: Return in about 4 weeks (around 9/15/2021) for Recheck.          This document has been electronically signed by HEDY Arreguin on August 18, 2021 15:41 CDT           I spent 22 minutes caring for Aneudy on this date of service. This time includes time spent by me in the following activities:preparing for the visit, reviewing tests, obtaining and/or reviewing a separately obtained history, performing a medically appropriate examination and/or evaluation , counseling and educating the patient/family/caregiver, ordering medications, tests, or procedures, referring and communicating with other health care professionals , documenting information in the medical record and care coordination    Results for orders placed or performed in visit on 06/18/20   Basic Metabolic Panel    Specimen: Blood   Result Value Ref Range    Glucose 102 (H) 70 - 99 mg/dL    BUN 34 (H) 7 - 23 mg/dL    Creatinine 1.33 (H) 0.70 - 1.30 mg/dL    Sodium 137 137 -  145 mmol/L    Potassium 3.7 3.4 - 5.0 mmol/L    Chloride 102 101 - 112 mmol/L    CO2 29.0 22.0 - 30.0 mmol/L    Calcium 9.1 8.4 - 10.2 mg/dL    eGFR Non  Amer 57 (L) 63 - 147 mL/min/1.73    BUN/Creatinine Ratio 25.6 (H) 7.0 - 25.0    Anion Gap 6.0 5.0 - 15.0 mmol/L   Results for orders placed or performed in visit on 01/31/20   Basic Metabolic Panel    Specimen: Blood   Result Value Ref Range    Glucose 96 70 - 99 mg/dL    BUN 31 (H) 7 - 23 mg/dL    Creatinine 1.13 0.70 - 1.30 mg/dL    Sodium 142 137 - 145 mmol/L    Potassium 3.3 (L) 3.4 - 5.0 mmol/L    Chloride 104 101 - 112 mmol/L    CO2 32.0 (H) 22.0 - 30.0 mmol/L    Calcium 8.7 8.4 - 10.2 mg/dL    eGFR Non  Amer 69 63 - 147 mL/min/1.73    BUN/Creatinine Ratio 27.4 (H) 7.0 - 25.0    Anion Gap 6.0 5.0 - 15.0 mmol/L   Results for orders placed or performed in visit on 01/31/19   Basic Metabolic Panel    Specimen: Blood   Result Value Ref Range    Glucose 111 (H) 74 - 99 mg/dL    BUN 26 (H) 9 - 20 mg/dL    Creatinine 1.20 0.66 - 1.25 mg/dL    Sodium 142 137 - 145 mmol/L    Potassium 3.7 3.4 - 5.0 mmol/L    Chloride 104 98 - 107 mmol/L    CO2 30.0 22.0 - 30.0 mmol/L    Calcium 8.5 8.4 - 10.2 mg/dL    eGFR Non  Amer 65 63 - 147 mL/min/1.73    BUN/Creatinine Ratio 21.7 7.0 - 25.0    Anion Gap 8.0 5.0 - 15.0 mmol/L

## 2021-08-18 NOTE — PATIENT INSTRUCTIONS
Colorectal Cancer Screening    Colorectal cancer screening is a group of tests that are used to check for colorectal cancer before symptoms develop. Colorectal refers to the colon and rectum. The colon and rectum are located at the end of the digestive tract and carry bowel movements out of the body.  Who should have screening?  All adults starting at age 50 until age 75 should have screening. Your health care provider may recommend screening at age 45. You will have tests every 1-10 years, depending on your results and the type of screening test.  You may have screening tests starting at an earlier age, or more frequently than other people, if you have any of the following risk factors:  · A personal or family history of colorectal cancer or abnormal growths (polyps).  · Inflammatory bowel disease, such as ulcerative colitis or Crohn's disease.  · A history of having radiation treatment to the abdomen or pelvic area for cancer.  · Colorectal cancer symptoms, such as changes in bowel habits or blood in your stool.  · A type of colon cancer syndrome that is passed from parent to child (hereditary), such as:  ? Huizar syndrome.  ? Familial adenomatous polyposis.  ? Turcot syndrome.  ? Peutz-Jeghers syndrome.  Screening recommendations for adults who are 75-85 years old vary depending on health.  How is screening done?  There are several types of colorectal screening tests. You may have one or more of the following:  · Guaiac-based fecal occult blood testing. For this test, a stool (feces) sample is checked for hidden (occult) blood, which could be a sign of colorectal cancer.  · Fecal immunochemical test (FIT). For this test, a stool sample is checked for blood, which could be a sign of colorectal cancer.  · Stool DNA test. For this test, a stool sample is checked for blood and changes in DNA that could lead to colorectal cancer.  · Sigmoidoscopy. During this test, a thin, flexible tube with a camera on the end  (sigmoidoscope) is used to examine the rectum and the lower colon.  · Colonoscopy. During this test, a long, flexible tube with a camera on the end (colonoscope) is used to examine the entire colon and rectum. With a colonoscopy, it is possible to take a sample of tissue (biopsy) and remove small polyps during the test.  · Virtual colonoscopy. Instead of a colonoscope, this type of colonoscopy uses X-rays (CT scan) and computers to produce images of the colon and rectum.  What are the benefits of screening?  Screening reduces your risk for colorectal cancer and can help identify cancer at an early stage, when the cancer can be removed or treated more easily. It is common for polyps to form in the lining of the colon, especially as you age. These polyps may be cancerous or become cancerous over time. Screening can identify these polyps.  What are the risks of screening?  Each screening test may have different risks.  · Stool sample tests have fewer risks than other types of screening tests. However, you may need more tests to confirm results from a stool sample test.  · Screening tests that involve X-rays expose you to low levels of radiation, which may slightly increase your cancer risk. The benefit of detecting cancer outweighs the slight increase in risk.  · Screening tests such as sigmoidoscopy and colonoscopy may place you at risk for bleeding, intestinal damage, infection, or a reaction to medicines given during the exam.  Talk with your health care provider to understand your risk for colorectal cancer and to make a screening plan that is right for you.  Questions to ask your health care provider  · When should I start colorectal cancer screening?  · What is my risk for colorectal cancer?  · How often do I need screening?  · Which screening tests do I need?  · How do I get my test results?  · What do my results mean?  Where to find more information  Learn more about colorectal cancer screening from:  · The  American Cancer Society: www.cancer.org  · The National Cancer Guild: www.cancer.gov  Summary  · Colorectal cancer screening is a group of tests used to check for colorectal cancer before symptoms develop.  · Screening reduces your risk for colorectal cancer and can help identify cancer at an early stage, when the cancer can be removed or treated more easily.  · All adults starting at age 50 until age 75 should have screening. Your health care provider may recommend screening at age 45.  · You may have screening tests starting at an earlier age, or more frequently than other people, if you have certain risk factors.  · Talk with your health care provider to understand your risk for colorectal cancer and to make a screening plan that is right for you.  This information is not intended to replace advice given to you by your health care provider. Make sure you discuss any questions you have with your health care provider.  Document Revised: 04/08/2020 Document Reviewed: 09/19/2018  Elsevier Patient Education © 2021 [employer]

## 2021-09-20 ENCOUNTER — APPOINTMENT (OUTPATIENT)
Dept: LAB | Facility: HOSPITAL | Age: 51
End: 2021-09-20

## 2022-08-05 ENCOUNTER — OFFICE VISIT (OUTPATIENT)
Dept: OTOLARYNGOLOGY | Facility: CLINIC | Age: 52
End: 2022-08-05

## 2022-08-05 ENCOUNTER — CLINICAL SUPPORT (OUTPATIENT)
Dept: AUDIOLOGY | Facility: CLINIC | Age: 52
End: 2022-08-05

## 2022-08-05 VITALS — WEIGHT: 185.6 LBS | TEMPERATURE: 97.2 F | BODY MASS INDEX: 30.92 KG/M2 | HEIGHT: 65 IN

## 2022-08-05 DIAGNOSIS — H81.02 MENIERE'S DISEASE OF LEFT EAR: ICD-10-CM

## 2022-08-05 DIAGNOSIS — H90.42 SENSORINEURAL HEARING LOSS (SNHL) OF LEFT EAR WITH UNRESTRICTED HEARING OF RIGHT EAR: ICD-10-CM

## 2022-08-05 DIAGNOSIS — H93.12 TINNITUS OF LEFT EAR: ICD-10-CM

## 2022-08-05 DIAGNOSIS — H81.02 MENIERE DISEASE, LEFT: Primary | ICD-10-CM

## 2022-08-05 DIAGNOSIS — H90.42 SENSORINEURAL HEARING LOSS (SNHL) OF LEFT EAR WITH UNRESTRICTED HEARING OF RIGHT EAR: Primary | ICD-10-CM

## 2022-08-05 PROCEDURE — 92557 COMPREHENSIVE HEARING TEST: CPT | Performed by: AUDIOLOGIST

## 2022-08-05 PROCEDURE — 99213 OFFICE O/P EST LOW 20 MIN: CPT | Performed by: OTOLARYNGOLOGY

## 2022-08-05 PROCEDURE — 92567 TYMPANOMETRY: CPT | Performed by: AUDIOLOGIST

## 2022-08-05 NOTE — PROGRESS NOTES
Subjective   Aneudy Zhang is a 52 y.o. male.       History of Present Illness   Patient is followed with left-sided Ménière's disease.  Also has kidney disease.  Now takes 1 blood pressure medicine with 6.25 mg of HCTZ.  Says he does not think his hearing is all that different than it was previously.  He also had BPPV at last visit and states this is resolved and has had no further dizziness.  Says he has hearing test at work and they tell him he has not lost any hearing.      The following portions of the patient's history were reviewed and updated as appropriate: allergies, current medications, past family history, past medical history, past social history, past surgical history and problem list.     reports that he has never smoked. He has never used smokeless tobacco.   Patient is not a tobacco user and has not been counseled for use of tobacco products      Review of Systems        Objective   Physical Exam  Ears: External ears no deformity, canals no discharge, tympanic membranes intact clear and mobile bilaterally.  Nares no discharge purulence  Oral cavity no masses or lesions  Pharynx no erythema or exudate  Neck no adenopathy or mass    Audiogram is obtained and reviewed and shows normal hearing on the right.  Flat hearing loss on the left that is slightly decreased compared to February 2021.  Tympanograms are type a bilaterally.  Discrimination is 100% on the right 44% on the left         Assessment and Plan   Diagnoses and all orders for this visit:    1. Meniere disease, left (Primary)    2. Sensorineural hearing loss (SNHL) of left ear with unrestricted hearing of right ear               Plan: Explained to the patient that his hearing has subjectively decreased a bit on the left.  He should continue low-salt diet and using his diuretic as directed by his medical doctors.  Continue to use ear protection at work and anytime he is around loud noise.  Return in 1 year with another hearing test and call  sooner for problems.

## 2022-08-05 NOTE — PROGRESS NOTES
STANDARD AUDIOMETRIC EVALUATION      Name:  Aneudy Zhang  :  1970  Age:  52 y.o.  Date of Evaluation:  2022      HISTORY    Reason for visit:  Aneudy Zhang is seen today for a hearing test at the request of Dr. Shayan Saucedo.  Patient reports a history of Meniere's Disease in his left ear, and previous BPPV in his right ear.  A previous audiogram shows a moderate flat sensorineural hearing loss in his left ear.  Today, he reports no change in his hearing and no more dizziness.  He states he hears ringing in his left ear.        EVALUATION    See Audiogram    RESULTS        Otoscopy and Tympanometry 226 Hz :  Right Ear:  Otoscopy:  Clear ear canal          Tympanometry:  Middle ear function within normal limits    Left Ear:   Otoscopy:  Clear ear canal        Tympanometry:  Middle ear function within normal limits    Test technique:  Standard Audiometry     Pure Tone Audiometry:   Patient responded to pure tones at 10-25 dB for 250-8000 Hz in right ear, and at 55-65 dB for 250-8000 Hz in left ear.       Speech Audiometry:        Right Ear:  Speech Reception Threshold (SRT) was obtained at 15 dBHL                 Speech Discrimination scores were 100% in quiet when words were presented at 55 dBHL       Left Ear:  Speech Reception Threshold (SRT) was obtained at 55 dBHL, masked                 Speech Discrimination scores were 44% in masking noise when words were presented at  85 dBHL    Reliability:   good    IMPRESSIONS:  1.  Tympanometry results are consistent with Middle ear function within normal limits in both ears.  2.  Pure tone results are consistent with hearing sensitivity essentially within normal limits for right ear, and moderate flat sensorineural hearing loss  in left ear.       RECOMMENDATIONS:  Patient is seeing the Ear Nose and Throat physician immediately following this examination.  It was a pleasure seeing Aneudy Zhang in Audiology today.  We would be happy to do further  testing or discuss these test as necessary.          This document has been electronically signed by Soila Greenwood MS CCC-TA on August 5, 2022 15:05 CDT       Soila Greenwood MS CCC-A  Licensed Audiologist

## 2023-08-08 ENCOUNTER — OFFICE VISIT (OUTPATIENT)
Dept: OTOLARYNGOLOGY | Facility: CLINIC | Age: 53
End: 2023-08-08
Payer: COMMERCIAL

## 2023-08-08 ENCOUNTER — CLINICAL SUPPORT (OUTPATIENT)
Dept: AUDIOLOGY | Facility: CLINIC | Age: 53
End: 2023-08-08
Payer: COMMERCIAL

## 2023-08-08 VITALS — HEART RATE: 71 BPM | OXYGEN SATURATION: 100 % | BODY MASS INDEX: 30.82 KG/M2 | WEIGHT: 185 LBS | HEIGHT: 65 IN

## 2023-08-08 DIAGNOSIS — H81.02 MENIERE'S DISEASE OF LEFT EAR: Primary | ICD-10-CM

## 2023-08-08 DIAGNOSIS — H81.02 MENIERE DISEASE, LEFT: Primary | ICD-10-CM

## 2023-08-08 DIAGNOSIS — H90.42 SENSORINEURAL HEARING LOSS (SNHL) OF LEFT EAR WITH UNRESTRICTED HEARING OF RIGHT EAR: ICD-10-CM

## 2023-08-08 PROCEDURE — 99213 OFFICE O/P EST LOW 20 MIN: CPT | Performed by: OTOLARYNGOLOGY

## 2023-08-08 PROCEDURE — 92557 COMPREHENSIVE HEARING TEST: CPT | Performed by: AUDIOLOGIST

## 2023-08-08 PROCEDURE — 92567 TYMPANOMETRY: CPT | Performed by: AUDIOLOGIST

## 2023-08-08 NOTE — PROGRESS NOTES
STANDARD AUDIOMETRIC EVALUATION      Name:  Aneudy Zhang  :  1970  Age:  53 y.o.  Date of Evaluation:  2023      HISTORY    Reason for visit:  Aneudy Zhang is seen today for a yearly hearing test at the request of Dr. Shayan Saucedo.  Patient reports a history of left ear problems including Meniere's Disease, hearing loss and tinnitus.  Today, he reports his hearing fluctuates, and it seems to be worse in the morning when he gets up.  He states he does not have a hearing aid.      EVALUATION    See Audiogram    RESULTS        Otoscopy and Tympanometry 226 Hz :  Right Ear:  Otoscopy:  Clear ear canal          Tympanometry:  Middle ear function within normal limits    Left Ear:   Otoscopy:  Clear ear canal        Tympanometry:  Middle ear function within normal limits    Test technique:  Standard Audiometry     Pure Tone Audiometry:   Patient responded to pure tones at 5-20 dB for 250-8000 Hz in right ear, and at 50-55 dB for 250-8000 Hz in left ear.       Speech Audiometry:        Right Ear:  Speech Reception Threshold (SRT) was obtained at 5 dBHL                 Speech Discrimination scores were 100% in quiet when words were presented at 45 dBHL       Left Ear:  Speech Reception Threshold (SRT) was obtained at 55 dBHL, masked                 Speech Discrimination scores were 64% in masking noise when words were presented at  85 dBHL    Reliability:   good    IMPRESSIONS:  1.  Tympanometry results are consistent with Middle ear function within normal limits in both ears.  2.  Pure tone results are consistent with hearing sensitivity within normal limits for right ear, and moderate flat sensorineural hearing loss  in left ear.       RECOMMENDATIONS:  Patient is seeing the Ear Nose and Throat physician immediately following this examination.  It was a pleasure seeing Aneudy Zhang in Audiology today.  We would be happy to do further testing or discuss these test as necessary.          This document  has been electronically signed by Solia Greenwood MS CCC-A on August 8, 2023 16:03 CDT       Soila Greenwood MS CCC-A  Licensed Audiologist

## 2023-08-09 NOTE — PROGRESS NOTES
Subjective   Aneudy Zhang is a 53 y.o. male.       History of Present Illness   Patient is followed with left-sided M‚niŠre's disease.  Has a history of a flat sensorineural loss on the left with normal hearing on the right.  Has chronic kidney disease, and hypertension.  He is on a blood pressure medicine with 6.25 mg of HCTZ.  Blood work is followed by his nephrologist.  Feels like his hearing is generally stable although still fluctuates at times.  No significant dizziness.      The following portions of the patient's history were reviewed and updated as appropriate: allergies, current medications, past family history, past medical history, past social history, past surgical history and problem list.     reports that he has never smoked. He has never used smokeless tobacco.   Patient is not a tobacco user and has not been counseled for use of tobacco products      Review of Systems        Objective   Physical Exam  Ears: External ears no deformity, canals no discharge, tympanic membranes intact clear and mobile bilaterally.  Audiogram is obtained and reviewed.  This shows hearing entirely within normal limits on the right with 100% discrimination.  Flat moderate sensorineural hearing loss on the left.  Type a tympanograms.  Discrimination is 64%.  Compared to 1 year ago thresholds are stable to slightly improved as is the discrimination score.         Assessment and Plan   Diagnoses and all orders for this visit:    1. Meniere disease, left (Primary)    2. Sensorineural hearing loss (SNHL) of left ear with unrestricted hearing of right ear             Plan: Reassurance to the patient that his hearing is stable to slightly improved.  Continue low-salt diet as well as diuretic therapy at the discretion of his nephrologist.  See me in 1 year with another hearing test and call sooner for problems.